# Patient Record
Sex: MALE | Race: BLACK OR AFRICAN AMERICAN
[De-identification: names, ages, dates, MRNs, and addresses within clinical notes are randomized per-mention and may not be internally consistent; named-entity substitution may affect disease eponyms.]

---

## 2020-06-26 LAB
ALBUMIN SERPL-MCNC: 3.6 G/DL (ref 3.5–5)
ALP SERPL-CCNC: 124 U/L (ref 38–126)
ANION GAP SERPL CALC-SCNC: 7 MMOL/L (ref 5–19)
APTT BLD: 32.8 SEC (ref 23.5–35.8)
AST SERPL-CCNC: 125 U/L (ref 17–59)
BILIRUB DIRECT SERPL-MCNC: 0 MG/DL (ref 0–0.4)
BILIRUB SERPL-MCNC: 0.5 MG/DL (ref 0.2–1.3)
BUN SERPL-MCNC: 4 MG/DL (ref 7–20)
CALCIUM: 9.1 MG/DL (ref 8.4–10.2)
CHLORIDE SERPL-SCNC: 107 MMOL/L (ref 98–107)
CO2 SERPL-SCNC: 23 MMOL/L (ref 22–30)
ERYTHROCYTE [DISTWIDTH] IN BLOOD BY AUTOMATED COUNT: 14.4 % (ref 11.5–14)
GLUCOSE SERPL-MCNC: 87 MG/DL (ref 75–110)
HCT VFR BLD CALC: 37.8 % (ref 37.9–51)
HGB BLD-MCNC: 12.9 G/DL (ref 13.5–17)
INR PPP: 1.08
MCH RBC QN AUTO: 34.1 PG (ref 27–33.4)
MCHC RBC AUTO-ENTMCNC: 34.2 G/DL (ref 32–36)
MCV RBC AUTO: 100 FL (ref 80–97)
PLATELET # BLD: 190 10^3/UL (ref 150–450)
POTASSIUM SERPL-SCNC: 4.5 MMOL/L (ref 3.6–5)
PROT SERPL-MCNC: 7.9 G/DL (ref 6.3–8.2)
PROTHROMBIN TIME: 14 SEC (ref 11.4–15.4)
RBC # BLD AUTO: 3.78 10^6/UL (ref 4.35–5.55)
WBC # BLD AUTO: 3.8 10^3/UL (ref 4–10.5)

## 2020-06-26 NOTE — EKG REPORT
SEVERITY:- ABNORMAL ECG -

SINUS RHYTHM

NONSPECIFIC T ABNORMALITIES, ANT-LAT LEADS

:

Confirmed by: Leonor Blanco 26-Jun-2020 19:34:34

## 2020-06-26 NOTE — RADIOLOGY REPORT (SQ)
EXAM DESCRIPTION:  CHEST PA/LATERAL



IMAGES COMPLETED DATE/TIME:  6/26/2020 12:02 pm



REASON FOR STUDY:  PRE-OP



COMPARISON:  2010



EXAM PARAMETERS:  NUMBER OF VIEWS: two views

TECHNIQUE: Digital Frontal and Lateral radiographic views of the chest acquired.

RADIATION DOSE: NA

LIMITATIONS: none



FINDINGS:  LUNGS AND PLEURA: No opacities, masses or pneumothorax. No pleural effusion.

MEDIASTINUM AND HILAR STRUCTURES: No masses or contour abnormalities.

HEART AND VASCULAR STRUCTURES: Heart normal size.  No evidence for failure.

BONES: No acute findings.

HARDWARE: None in the chest.

OTHER: No other significant finding.



IMPRESSION:  NO SIGNIFICANT RADIOGRAPHIC FINDING IN THE CHEST.



TECHNICAL DOCUMENTATION:  JOB ID:  [id number]

 2011 [employer]- All Rights Reserved



Reading location - IP/workstation name: [email or web address]

## 2020-06-29 ENCOUNTER — HOSPITAL ENCOUNTER (OUTPATIENT)
Dept: HOSPITAL 62 - OROUT | Age: 60
Discharge: HOME | End: 2020-06-29
Attending: SURGERY
Payer: MEDICAID

## 2020-06-29 VITALS — DIASTOLIC BLOOD PRESSURE: 82 MMHG | SYSTOLIC BLOOD PRESSURE: 134 MMHG

## 2020-06-29 DIAGNOSIS — F17.210: ICD-10-CM

## 2020-06-29 DIAGNOSIS — I10: ICD-10-CM

## 2020-06-29 DIAGNOSIS — Z79.899: ICD-10-CM

## 2020-06-29 DIAGNOSIS — K81.1: Primary | ICD-10-CM

## 2020-06-29 DIAGNOSIS — Z87.828: ICD-10-CM

## 2020-06-29 PROCEDURE — 93005 ELECTROCARDIOGRAM TRACING: CPT

## 2020-06-29 PROCEDURE — 71046 X-RAY EXAM CHEST 2 VIEWS: CPT

## 2020-06-29 PROCEDURE — C9803 HOPD COVID-19 SPEC COLLECT: HCPCS

## 2020-06-29 PROCEDURE — 36415 COLL VENOUS BLD VENIPUNCTURE: CPT

## 2020-06-29 PROCEDURE — 85027 COMPLETE CBC AUTOMATED: CPT

## 2020-06-29 PROCEDURE — 86900 BLOOD TYPING SEROLOGIC ABO: CPT

## 2020-06-29 PROCEDURE — 88304 TISSUE EXAM BY PATHOLOGIST: CPT

## 2020-06-29 PROCEDURE — 85730 THROMBOPLASTIN TIME PARTIAL: CPT

## 2020-06-29 PROCEDURE — 93010 ELECTROCARDIOGRAM REPORT: CPT

## 2020-06-29 PROCEDURE — 87635 SARS-COV-2 COVID-19 AMP PRB: CPT

## 2020-06-29 PROCEDURE — 80053 COMPREHEN METABOLIC PANEL: CPT

## 2020-06-29 PROCEDURE — 47562 LAPAROSCOPIC CHOLECYSTECTOMY: CPT

## 2020-06-29 PROCEDURE — 86850 RBC ANTIBODY SCREEN: CPT

## 2020-06-29 PROCEDURE — 86901 BLOOD TYPING SEROLOGIC RH(D): CPT

## 2020-06-29 PROCEDURE — 85610 PROTHROMBIN TIME: CPT

## 2020-06-29 NOTE — DISCHARGE SUMMARY
Discharge Summary (SDC)





- Discharge


Final Diagnosis: 





Symptomatic gallstones


Date of Surgery: 06/29/20


Discharge Date: 06/29/20


Condition: Stable


Treatment or Instructions: 


Discharge home.  Diet as tolerated.  Activity: No lifting greater than 10 pounds

x 3 to 4 weeks.  Follow-up with me in 2 to 3 weeks.  Okay to shower starting 

Wednesday.  No hot tubs, swimming pools, or bath tubs x2 weeks.  Oxycodone 5 mg 

p.o. every 6 hours as needed for pain.


Prescriptions: 


Oxycodone HCl 5 mg PO Q4HP PRN #14 capsule


 PRN Reason: For Pain


Referrals: 


ARNOLD AUGUSTIN, ADAIRC [Primary Care Provider] - 


Discharge Diet: As Tolerated


Respiratory Treatments at Home: Deep Breathing/Coughing, Incentive Spirometer


Discharge Activity: No Lifting Over 10 Pounds, No Lifting/Push/Pulling


Home Care Assistance: None Needed


Report the Following to Your Physician Immediately: Shortness of Breath, Nausea,

Vomiting, Increase in Pain, Yellow Skin, Fever over 101 Degrees, Unusual 

Bleeding, Warmth, Increased Soreness

## 2020-06-29 NOTE — OPERATIVE REPORT
Nonrecallable Operative Report


DATE OF SURGERY: 06/29/20


PREOPERATIVE DIAGNOSIS: Symptomatic cholelithiasis


POSTOPERATIVE DIAGNOSIS: Same as above


OPERATION: Laparoscopic cholecystectomy


SURGEON: BARBARA BALES


1ST ASSISTANT: ESHA LA


ANESTHESIA: GA


TISSUE REMOVED OR ALTERED: Gallbladder


COMPLICATIONS: 





None apparent


ESTIMATED BLOOD LOSS: Minimal


PROCEDURE: 





Drain/implants: None.





Procedure in detail: After informed consent was obtained, the patient was 

brought to the operating room and laid in the supine position.  The area of the 

abdomen was prepped and draped in a normal sterile fashion.  A curvilinear 

supraumbilical incision was created with a 15 blade scalpel.  There was a small 

umbilical hernia defect, that was widened in order to access the abdomen.  This 

was done sharply.  The balloon trocar was inserted through the widened umbilical

hernia defect.  Gas insufflation was attached, and pneumoperitoneum was 

achieved.





A subxiphoid 5 mm port was then placed under direct laparoscopic visualization. 

2 more 5 mm ports were placed in the right upper quadrant in similar fashion.  

Atraumatic graspers were placed through the 5 mm ports.  The gallbladder was 

retracted cephalad and laterally.  Dissection was begun in the triangle of 

Calot.  The cystic duct and cystic artery were fully visualized and 

skeletonized, seeing the liver through the triangle.  Once the critical view of 

safety was obtained, the cystic duct and cystic artery were clipped and cut with

laparoscopic instruments.  The gallbladder was removed from the liver using 

Bovie electrocautery.  Upon gallbladder, the posterior branch of the cystic 

artery was identified.  This was clipped and cut.  The remainder of the 

gallbladder was then removed from the liver using electrocautery.  The 

gallbladder was then placed into an Endo Catch bag and pulled out through the 

umbilicus.  The camera was reinserted.  The hilum was inspected.  The abdomen 

was copiously irrigated and suctioned until the effluent was clear.  There was 

no evidence of blood or bile leakage in the hilum.  Once this was confirmed, the

5 mm trochars were removed under direct laparoscopic visualization.  The 

supraumbilical trocar was removed, and pneumoperitoneum was relieved.





The supraumbilical fascia was closed using #1 Ethibond suture in figure-of-eight

fashion.  2 separate sutures were used to ensure adequate closure.  The 

overlying skin was closed using 4-0 Vicryl Rapide suture in subcuticular 

fashion.  All sponge, instrument, needle counts were correct x2.





Condition: Stable.





Esha La PA-C was scrubbed and present the entirety of the procedure.  

She assisted with all portions of the procedure including opening of the skin, 

entry into the abdomen, placement of the trochars, manipulation of the 

gallbladder, removal of the gallbladder, closure of the fascia, and closure of 

the skin.